# Patient Record
Sex: MALE | Race: WHITE | Employment: FULL TIME
[De-identification: names, ages, dates, MRNs, and addresses within clinical notes are randomized per-mention and may not be internally consistent; named-entity substitution may affect disease eponyms.]

---

## 2022-05-25 LAB
CHOLEST SERPL-MCNC: 138 MG/DL
GLUCOSE SERPL-MCNC: 83 MG/DL (ref 65–100)
HDLC SERPL-MCNC: 55 MG/DL (ref 40–60)
LDLC SERPL CALC-MCNC: 56 MG/DL
TRIGL SERPL-MCNC: 135 MG/DL (ref 35–150)

## 2022-09-14 ENCOUNTER — OFFICE VISIT (OUTPATIENT)
Dept: FAMILY MEDICINE CLINIC | Facility: CLINIC | Age: 22
End: 2022-09-14
Payer: COMMERCIAL

## 2022-09-14 VITALS
DIASTOLIC BLOOD PRESSURE: 70 MMHG | WEIGHT: 238.4 LBS | HEART RATE: 57 BPM | SYSTOLIC BLOOD PRESSURE: 118 MMHG | OXYGEN SATURATION: 97 % | HEIGHT: 72 IN | TEMPERATURE: 98.3 F | BODY MASS INDEX: 32.29 KG/M2

## 2022-09-14 DIAGNOSIS — I10 PRIMARY HYPERTENSION: Primary | ICD-10-CM

## 2022-09-14 PROCEDURE — 99203 OFFICE O/P NEW LOW 30 MIN: CPT | Performed by: PHYSICIAN ASSISTANT

## 2022-09-14 RX ORDER — LISINOPRIL 5 MG/1
5 TABLET ORAL DAILY
Qty: 90 TABLET | Refills: 2 | Status: SHIPPED | OUTPATIENT
Start: 2022-09-14 | End: 2022-09-23 | Stop reason: SDUPTHER

## 2022-09-14 RX ORDER — LISINOPRIL 10 MG/1
10 TABLET ORAL DAILY
COMMUNITY
End: 2022-09-14 | Stop reason: SDUPTHER

## 2022-09-14 ASSESSMENT — PATIENT HEALTH QUESTIONNAIRE - PHQ9
SUM OF ALL RESPONSES TO PHQ QUESTIONS 1-9: 0
SUM OF ALL RESPONSES TO PHQ QUESTIONS 1-9: 0
2. FEELING DOWN, DEPRESSED OR HOPELESS: 0
SUM OF ALL RESPONSES TO PHQ9 QUESTIONS 1 & 2: 0
1. LITTLE INTEREST OR PLEASURE IN DOING THINGS: 0
SUM OF ALL RESPONSES TO PHQ QUESTIONS 1-9: 0
SUM OF ALL RESPONSES TO PHQ QUESTIONS 1-9: 0

## 2022-09-14 NOTE — PROGRESS NOTES
[address] [place] [address]  Phone [phone]  Fax [phone]  Bo MUNOZ    Patient: Jorge Gregory  YOB: 2000  Patient Age 25 y.o. Patient sex: male  Medical Record:  [id number]  Visit Date:9/14/2022   Author:  Shauna Batista. Niecy Casas Note     Chief complaint  Jorge Gregory  is a 25 y.o. male who was seen on 09/15/22  10:08 AM  for the following reasons:    Chief Complaint   Patient presents with    New Patient     Establish care    Hypertension       Current Medical problems addressed    New patient  Ezra Jordan is 26 yo male with history of hypertension and needs refill of current meds. Great aunts had cerebral aneurysms and family hx of heart disease on dad. He works and then works out 3 days a week and been well controlled on meds. 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 Primary hypertension  I10 lisinopril (PRINIVIL;ZESTRIL) 5 MG tablet         Ezra Jordan was seen today for new patient and hypertension. Diagnoses and all orders for this visit:    Primary hypertension  -     lisinopril (PRINIVIL;ZESTRIL) 5 MG tablet; Take 1 tablet by mouth daily    Plan includes the following:  Plan: Well-controlled, continue current medications, medication adherence emphasized and lifestyle modifications recommended     No follow-up provider specified. No orders of the defined types were placed in this encounter. Past Medical History: Allergies:No Known Allergies    Current Medications:   Medications marked \"taking\" at this time:  Current Outpatient Medications   Medication Sig Dispense Refill    lisinopril (PRINIVIL;ZESTRIL) 5 MG tablet Take 1 tablet by mouth daily 90 tablet 2     No current facility-administered medications for this visit. Current Problem List: There is no problem list on file for this patient.       Social History:   Social History     Tobacco Use    Smoking status: Never Smokeless tobacco: Never   Substance Use Topics    Alcohol use: Yes     Alcohol/week: 5.0 standard drinks     Types: 5 Cans of beer per week       Family History:   Family History   Problem Relation Age of Onset    Elevated Lipids Mother     High Blood Pressure Father     Elevated Lipids Father     Cancer Maternal Aunt         skin can    Cancer Maternal Grandfather         skin cancer    Coronary Art Dis Paternal Grandmother        Surgical History:  Past Surgical History:   Procedure Laterality Date    FRACTURE SURGERY Right     ring finger       ROS  Review of Systems   Constitutional:  Negative for fever. Eyes:  Negative for visual disturbance. Respiratory:  Negative for cough and shortness of breath. Cardiovascular:  Negative for chest pain. Gastrointestinal:  Negative for blood in stool. Musculoskeletal:  Negative for myalgias. Skin:  Negative for rash. Neurological:  Negative for syncope and weakness. Psychiatric/Behavioral:  Negative for dysphoric mood. /70   Pulse 57   Temp 98.3 °F (36.8 °C)   Ht 6' (1.829 m)   Wt 238 lb 6.4 oz (108.1 kg)   SpO2 97%   BMI 32.33 kg/m²   Body mass index is 32.33 kg/m². Physical Exam    Physical Exam  Vitals and nursing note reviewed. Constitutional:       Appearance: Normal appearance. Eyes:      Conjunctiva/sclera: Conjunctivae normal.   Cardiovascular:      Rate and Rhythm: Normal rate and regular rhythm. Pulmonary:      Effort: Pulmonary effort is normal.      Breath sounds: Normal breath sounds. Musculoskeletal:      Cervical back: Neck supple. Right lower leg: No edema. Left lower leg: No edema. Neurological:      Mental Status: He is alert. Psychiatric:         Mood and Affect: Mood normal.        I have reviewed the patient's past medical history, social history and family history and vitals. We have discussed treatment plan and follow up and given patient instructions.   Patient's questions are answered and we will follow up as indicated. No follow-ups on file. Irma Singh PA-C  10:08 AM  9/15/2022

## 2022-09-14 NOTE — PATIENT INSTRUCTIONS
Blood pressure is elevated today. Advised of the importance of compliance with the patients medications. Patient agrees to monitoring bp for 2 weeks . If it remains  greater than or equal to 140/90  they will call to schedule an appointment to return for medication adjustments.

## 2022-09-15 ASSESSMENT — ENCOUNTER SYMPTOMS
BLOOD IN STOOL: 0
SHORTNESS OF BREATH: 0
COUGH: 0

## 2022-09-22 ENCOUNTER — TELEPHONE (OUTPATIENT)
Dept: FAMILY MEDICINE CLINIC | Facility: CLINIC | Age: 22
End: 2022-09-22

## 2022-09-22 DIAGNOSIS — I10 PRIMARY HYPERTENSION: ICD-10-CM

## 2022-09-22 NOTE — TELEPHONE ENCOUNTER
Pt called and stated that he has checked with the pharmacy twice and they have stated both times that they have not received the request for his lisinopril. Would you please re send the prescription to the [facility] in [place]?     Thank  you

## 2022-09-23 RX ORDER — LISINOPRIL 5 MG/1
5 TABLET ORAL DAILY
Qty: 90 TABLET | Refills: 2 | Status: SHIPPED | OUTPATIENT
Start: 2022-09-23

## 2022-09-23 NOTE — TELEPHONE ENCOUNTER
Resbumitted but make sure they are checking correct pharmacy and if its wrong one it can be transferred  Pharmacy: [id number], [place] - [facility]  Med Dose History

## 2023-01-23 ENCOUNTER — OFFICE VISIT (OUTPATIENT)
Dept: FAMILY MEDICINE CLINIC | Facility: CLINIC | Age: 23
End: 2023-01-23
Payer: COMMERCIAL

## 2023-01-23 DIAGNOSIS — I10 BENIGN ESSENTIAL HTN: Chronic | ICD-10-CM

## 2023-01-23 DIAGNOSIS — I47.1 PAROXYSMAL SUPRAVENTRICULAR TACHYCARDIA (HCC): ICD-10-CM

## 2023-01-23 DIAGNOSIS — Z00.00 WELL ADULT EXAM: Primary | ICD-10-CM

## 2023-01-23 DIAGNOSIS — Z13.1 SCREENING FOR DIABETES MELLITUS: ICD-10-CM

## 2023-01-23 DIAGNOSIS — Z79.899 HIGH RISK MEDICATION USE: ICD-10-CM

## 2023-01-23 DIAGNOSIS — Z11.4 SCREENING FOR HIV WITHOUT PRESENCE OF RISK FACTORS: ICD-10-CM

## 2023-01-23 DIAGNOSIS — Z13.220 SCREENING FOR LIPID DISORDERS: ICD-10-CM

## 2023-01-23 DIAGNOSIS — Z11.59 NEED FOR HEPATITIS C SCREENING TEST: ICD-10-CM

## 2023-01-23 DIAGNOSIS — E66.09 CLASS 1 OBESITY DUE TO EXCESS CALORIES WITHOUT SERIOUS COMORBIDITY WITH BODY MASS INDEX (BMI) OF 32.0 TO 32.9 IN ADULT: ICD-10-CM

## 2023-01-23 PROBLEM — E66.811 CLASS 1 OBESITY DUE TO EXCESS CALORIES WITHOUT SERIOUS COMORBIDITY WITH BODY MASS INDEX (BMI) OF 32.0 TO 32.9 IN ADULT: Status: ACTIVE | Noted: 2023-01-23

## 2023-01-23 PROBLEM — R00.2 HEART PALPITATIONS: Status: ACTIVE | Noted: 2018-09-19

## 2023-01-23 PROBLEM — E66.811 CLASS 1 OBESITY DUE TO EXCESS CALORIES WITHOUT SERIOUS COMORBIDITY WITH BODY MASS INDEX (BMI) OF 32.0 TO 32.9 IN ADULT: Chronic | Status: ACTIVE | Noted: 2023-01-23

## 2023-01-23 PROBLEM — D17.1 LIPOMA OF ABDOMINAL WALL: Status: ACTIVE | Noted: 2018-03-01

## 2023-01-23 PROCEDURE — 99385 PREV VISIT NEW AGE 18-39: CPT | Performed by: FAMILY MEDICINE

## 2023-01-23 PROCEDURE — 3078F DIAST BP <80 MM HG: CPT | Performed by: FAMILY MEDICINE

## 2023-01-23 PROCEDURE — 3074F SYST BP LT 130 MM HG: CPT | Performed by: FAMILY MEDICINE

## 2023-01-23 RX ORDER — LISINOPRIL 10 MG/1
10 TABLET ORAL DAILY
Qty: 90 TABLET | Refills: 3 | Status: SHIPPED | OUTPATIENT
Start: 2023-01-23

## 2023-01-23 ASSESSMENT — PATIENT HEALTH QUESTIONNAIRE - PHQ9
2. FEELING DOWN, DEPRESSED OR HOPELESS: 0
SUM OF ALL RESPONSES TO PHQ QUESTIONS 1-9: 0
1. LITTLE INTEREST OR PLEASURE IN DOING THINGS: 0
SUM OF ALL RESPONSES TO PHQ9 QUESTIONS 1 & 2: 0

## 2023-01-23 ASSESSMENT — ENCOUNTER SYMPTOMS
VOMITING: 0
NAUSEA: 0
WHEEZING: 0
SHORTNESS OF BREATH: 0
CONSTIPATION: 0
BACK PAIN: 0
ABDOMINAL PAIN: 0
COUGH: 0
CHEST TIGHTNESS: 0
EYES NEGATIVE: 1
ALLERGIC/IMMUNOLOGIC NEGATIVE: 1
DIARRHEA: 0

## 2023-01-23 NOTE — PATIENT INSTRUCTIONS
Fareed Salazar and Yimi Burns (Cat)      The easiest way to reach us is via the [employer] patient portal. But if you are not computer \"literate,\" consider asking a family member or friend to help you get set up on your [facility] [employer] or smart phone. You might even want to \"allow\" a family member to get/send messages for you! In a more urgent situation, or if you have not heard back in a reasonable amount of time after leaving a [employer] message, just call the office at [phone]. If it's an emergency, always start by calling 911.       ^^^^^    Low Carbohydrate/Reduced Fat Diet (25/40 Rule)    With insulin resistance (BUT ALSO FOR ANYONE TRYING TO LOSE WEIGHT), carbohydrates become a source of problems as carbohydrates break down with digestion into glucose (sugar) molecules. After years of following patients in the clinic and monitoring thousands of patients home  glucose monitor reports in combination with advanced lipid  evaluation, I have come to the conclusion that carbohydrates ingestion greater than 25 grams in a four hour period leads to the liver manufacturing large quantities of bad fats and bad inflammatory factors for approximately four days. It is vital that every patient eat <25 grams of carbohydrates every four hours strictly and consistently! Bread and fruit are almost deal-breakers when you attempt to stay under 25 grams of carbohydrates every four hours. It seems strange to say this, but it is better to avoid bread and fruit altogether if you have any form of insulin resistance. I suggest that insulin resistant patients avoid pasta and rice as well due to the amount of carbohydrates contained within pasta and rice.   It is difficult to stay under  25 grams of carbohydrates when rice and pasta are included in the meal. Some dieticians/clinicians suggest that whole wheat pasta and brown rice are acceptable options due to the complex carbohydrates contained within these foods, but I see problems with this strategy and suggest that you avoid all pasta and all rice intake. Low carbohydrates diets emphasize proteins as proteins are your friend!   To succeed at our low carbohydrate/reduced fat diet you will be eating mostly, meat, vegetables, soup, and salad. Most vegetables are great for this diet with the exception of potatoes, corn, carrots, and beets. Some of the other vegetables contain a modest amount of carbohydrates but, in my experience, most of the other vegetables are well tolerated when reasonable portions are eaten. Your carbohydrate allowance is strictly <25 grams in a four hour period. If you skip a meal, you cannot eat 50 grams of carbohydrates in the next feeding time period! There is no credit system for your carbohydrate reduced diet! If you miss eating a meal, you simply loose the 25 gram carbohydrate allotment that you could have eaten. If you eat all 25 grams of the carbohydrates that you are allowed at a mealtime, you cannot eat a 12 gram carbohydrate snack in 2 hours. If you want to snack between meals, you have to lessen your carbohydrate intake at mealtimes so that the total carbohydrates for the meal and snack do not exceed 25 grams total in a four hour time period. Regarding fat intake, you want to be careful to avoid saturated fats, often obtained from red meat, eggs, and fried foods. If the meat doesnt swim underwater or fly, it is probably bad for you! You can cook your meats almost anyway imaginable except do not aguilar your food! Baking, broiling, grilling, rotisserie, roasting, or microwaving your meats all will be fine.    SO... LIMIT BREAD, FRUIT, PASTA, RICE, POTATOES, AND CORN! Adding >40 minutes of nonstop, aerobic exercise daily to the above low carbohydrate/reduced fat diet will help remove the endothelial inflammatory factors and help you maximize your overall cardiovascular health status!     The 25/40 rule is the summation of what every insulin resistant patient needs to have deeply embedded in their mindset! The 25 represents the absolute 4 hour limit of carbohydrate intake while the 40 represents the minimum amount of nonstop, aerobic exercise necessary to obtain removal of endothelial inflammation. Following the 25/40 rule is the key to successful lifestyle changes that facilitate a healthy cardiovascular system! Are you DRINKING any calories? If you are, they are adding weight and not even filling you up. Cutting those carbs out will cause you to lose weight without even trying!!! Think about SWEET TEA (killing Rosibel Caseom Javier), JUICES (just concentrated fruits without fiber), SODA (I hate to say it but DIET SODA would certainly be better), MILK (God designed it for making young calves into BIG cows fast. You don't need it. Try almond, coconut, rice, soy milk or combinations. But make sure they are UNSWEETENED. BEER and WINE (self explanatory). An \"occasional\" one of these is probably not an issue. But NOT daily. So...DO NOT DRINK:  SWEET TEA, SUGAR SODA, JUICES, MILK, BEER OR WINE! Want more good ideas?   Watch the documentary called:  \"[place] [employer]\"on [employer] (or other online streaming platform)

## 2023-01-23 NOTE — PROGRESS NOTES
Óscar Steiner DO                Diplomate of the American [employer] Board of [facility] [place]         [phone]    Ronak Moreno is a 21 y.o. male who was seen on 1/23/2023 for   Chief Complaint   Patient presents with    New Patient     Would like to establish today. Does have hx or HTN. Brought a log from home today as well. Assessment & Plan     Diagnosis Orders   1. Well adult exam  HIV 1/2 Ag/Ab, 4TH Generation,W Rflx Confirm    Hepatitis C Antibody    Basic Metabolic Panel    1/98/52      2. Benign essential HTN  Basic Metabolic Panel    lisinopril (PRINIVIL;ZESTRIL) 10 MG tablet    Well-controlled but frequent blood pressure checks advised. Report if greater than 139/89      3. Paroxysmal supraventricular tachycardia (HCC)      Stable. 4. Class 1 obesity due to excess calories without serious comorbidity with body mass index (BMI) of 32.0 to 32.9 in adult      Unchanged. Low carb diet advised/reviewed. Information given. 5. Screening for diabetes mellitus  Basic Metabolic Panel      6. Screening for lipid disorders        7. Need for hepatitis C screening test  Hepatitis C Antibody      8. Screening for HIV without presence of risk factors  HIV 1/2 Ag/Ab, 4TH Generation,W Rflx Confirm      9. High risk medication use  Basic Metabolic Panel        Follow-up and Dispositions    Return in about 1 year (around 1/24/2024) for CPE CINDY. RECENT LABS/TESTS TO REVIEW and DISCUSS    No results found for this visit on 01/23/23. PHQ-9  1/23/2023 9/14/2022   Little interest or pleasure in doing things 0 0   Feeling down, depressed, or hopeless 0 0   PHQ-2 Score 0 0   PHQ-9 Total Score 0 0     Subjective    HPI:     This is a 42-year-old male patient who is here today to establish care at [address] [place] [address] and have a complete physical examination. The patient did not have labs in advance of today's visit.  Instead we will draw labs today or at a future date. 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 The patient has a history of hypertension. He brings a record of blood pressure readings with him today. He takes lisinopril 10 mg daily. Blood pressure today was initially 136/92. Subsequently 130/76. Reviewed and updated this visit by provider:  Tobacco           Review of Systems   Constitutional:  Negative for fatigue and fever. HENT: Negative. Eyes: Negative. Respiratory:  Negative for cough, chest tightness, shortness of breath and wheezing. Cardiovascular:  Negative for chest pain and leg swelling. Gastrointestinal:  Negative for abdominal pain, constipation, diarrhea, nausea and vomiting. Endocrine: Negative. Genitourinary:  Negative for difficulty urinating, dysuria and frequency. Musculoskeletal:  Negative for arthralgias, back pain and neck pain. Skin:  Negative for rash. Allergic/Immunologic: Negative. Neurological:  Negative for dizziness and light-headedness. Psychiatric/Behavioral:  Negative for dysphoric mood. The patient is not nervous/anxious. All other systems reviewed and are negative. Objective    /76   Pulse 84   Resp 16   Ht 6' (1.829 m)   Wt 239 lb 3.2 oz (108.5 kg)   SpO2 99%   BMI 32.44 kg/m²     Physical Exam  Vitals reviewed. Constitutional:       General: He is not in acute distress. Appearance: Normal appearance. He is well-developed and well-groomed. HENT:      Head: Normocephalic and atraumatic. Right Ear: Tympanic membrane, ear canal and external ear normal.      Left Ear: Tympanic membrane, ear canal and external ear normal.      Mouth/Throat:      Mouth: Mucous membranes are moist.   Eyes:      General: Lids are normal.      Extraocular Movements: Extraocular movements intact. Conjunctiva/sclera: Conjunctivae normal.      Pupils: Pupils are equal, round, and reactive to light.    Neck: Vascular: No carotid bruit. Cardiovascular:      Rate and Rhythm: Normal rate and regular rhythm. Heart sounds: Normal heart sounds. Pulmonary:      Effort: Pulmonary effort is normal.      Breath sounds: Normal breath sounds. Abdominal:      General: Bowel sounds are normal.      Palpations: Abdomen is soft. There is no hepatomegaly, splenomegaly or mass. Tenderness: There is no abdominal tenderness. Hernia: No hernia is present. There is no hernia in the left inguinal area or right inguinal area. Genitourinary:     Penis: Normal.       Testes: Normal.      Epididymis:      Right: Normal.      Left: Normal.   Musculoskeletal:      Cervical back: Neck supple. No rigidity or tenderness. Lymphadenopathy:      Cervical: No cervical adenopathy. Skin:     General: Skin is warm and dry. Neurological:      General: No focal deficit present. Mental Status: He is alert and oriented to person, place, and time. Cranial Nerves: Cranial nerves 2-12 are intact. Sensory: Sensation is intact. Motor: Motor function is intact. Deep Tendon Reflexes: Reflexes are normal and symmetric. Reflex Scores:       Brachioradialis reflexes are 2+ on the right side and 2+ on the left side. Patellar reflexes are 2+ on the right side and 2+ on the left side. Psychiatric:         Attention and Perception: Attention and perception normal.         Mood and Affect: Mood and affect normal.         Speech: Speech normal.         Behavior: Behavior normal. Behavior is cooperative. Thought Content: Thought content normal.         Cognition and Memory: Cognition and memory normal.         Judgment: Judgment normal.       On this date 01/23/2023 I have spent 30 minutes reviewing previous notes, lab/imaging results and face to face with the patient discussing the diagnoses and importance of compliance with the treatment plan, as well as documenting on the day of the visit.         Kiran Fermin Gilmar Hartley, DO  [facility]

## 2023-02-07 VITALS
BODY MASS INDEX: 32.4 KG/M2 | OXYGEN SATURATION: 99 % | WEIGHT: 239.2 LBS | DIASTOLIC BLOOD PRESSURE: 76 MMHG | HEIGHT: 72 IN | SYSTOLIC BLOOD PRESSURE: 130 MMHG | RESPIRATION RATE: 16 BRPM | HEART RATE: 84 BPM

## 2023-02-10 ENCOUNTER — NURSE TRIAGE (OUTPATIENT)
Dept: OTHER | Facility: CLINIC | Age: 23
End: 2023-02-10

## 2023-02-10 NOTE — TELEPHONE ENCOUNTER
Location of patient: [place]    Location of employment: 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 Helped out with lumbar puncture. Patient tested positive for bacterial meningitis and associate was wearing surgical mask during encounter\"    Current Symptoms: denies at this time.     Pain Severity: denies    Temperature: denies     What has been tried: n/a    LMP: NA Pregnant: NA    Recommended disposition: See PCP within 24 Hours    Care advice provided, caller verbalizes understanding; denies any other questions or concerns.    Patient agrees to follow up with occupational health in recommended time frame, provider approved locations and associate health nurses contact information.     Reason for Disposition   [1] Bacterial meningitis EXPOSURE (Close Contact) AND [2] NO symptoms    Protocols used: Meningitis Exposure-ADULT-

## 2023-02-22 PROBLEM — Z13.1 SCREENING FOR DIABETES MELLITUS: Chronic | Status: RESOLVED | Noted: 2023-01-23 | Resolved: 2023-02-22

## 2023-02-22 PROBLEM — Z13.220 SCREENING FOR LIPID DISORDERS: Chronic | Status: RESOLVED | Noted: 2023-01-23 | Resolved: 2023-02-22

## 2023-02-22 PROBLEM — Z00.00 WELL ADULT EXAM: Chronic | Status: RESOLVED | Noted: 2023-01-23 | Resolved: 2023-02-22

## 2023-07-12 DIAGNOSIS — I10 PRIMARY HYPERTENSION: ICD-10-CM

## 2023-07-12 RX ORDER — LISINOPRIL 5 MG/1
TABLET ORAL
Qty: 30 TABLET | Refills: 8 | OUTPATIENT
Start: 2023-07-12

## 2024-02-15 DIAGNOSIS — I10 BENIGN ESSENTIAL HTN: Chronic | ICD-10-CM

## 2024-02-15 RX ORDER — LISINOPRIL 10 MG/1
10 TABLET ORAL DAILY
Qty: 90 TABLET | Refills: 0 | Status: SHIPPED | OUTPATIENT
Start: 2024-02-15

## 2024-02-15 NOTE — TELEPHONE ENCOUNTER
Patient called needs to get refill on lisinopril (PRINIVIL;ZESTRIL) 10 MG tablet  to [facility]

## 2024-05-01 ENCOUNTER — OFFICE VISIT (OUTPATIENT)
Dept: FAMILY MEDICINE CLINIC | Facility: CLINIC | Age: 24
End: 2024-05-01

## 2024-05-01 VITALS
DIASTOLIC BLOOD PRESSURE: 88 MMHG | BODY MASS INDEX: 31.64 KG/M2 | SYSTOLIC BLOOD PRESSURE: 136 MMHG | HEIGHT: 72 IN | HEART RATE: 74 BPM | OXYGEN SATURATION: 99 % | WEIGHT: 233.6 LBS

## 2024-05-01 DIAGNOSIS — I10 BENIGN ESSENTIAL HTN: Chronic | ICD-10-CM

## 2024-05-01 LAB
ALBUMIN SERPL-MCNC: 4.6 G/DL (ref 3.5–5)
ALBUMIN/GLOB SERPL: 1.5 (ref 1–1.9)
ALP SERPL-CCNC: 60 U/L (ref 40–129)
ALT SERPL-CCNC: 36 U/L (ref 12–65)
ANION GAP SERPL CALC-SCNC: 9 MMOL/L (ref 9–18)
AST SERPL-CCNC: 28 U/L (ref 15–37)
BASOPHILS # BLD: 0 K/UL (ref 0–0.2)
BASOPHILS NFR BLD: 1 % (ref 0–2)
BILIRUB SERPL-MCNC: 0.9 MG/DL (ref 0–1.2)
BUN SERPL-MCNC: 12 MG/DL (ref 6–23)
CALCIUM SERPL-MCNC: 10.1 MG/DL (ref 8.8–10.2)
CHLORIDE SERPL-SCNC: 101 MMOL/L (ref 98–107)
CHOLEST SERPL-MCNC: 151 MG/DL (ref 0–200)
CO2 SERPL-SCNC: 29 MMOL/L (ref 20–28)
CREAT SERPL-MCNC: 0.99 MG/DL (ref 0.8–1.3)
DIFFERENTIAL METHOD BLD: ABNORMAL
EOSINOPHIL # BLD: 0.1 K/UL (ref 0–0.8)
EOSINOPHIL NFR BLD: 3 % (ref 0.5–7.8)
ERYTHROCYTE [DISTWIDTH] IN BLOOD BY AUTOMATED COUNT: 13.2 % (ref 11.9–14.6)
GLOBULIN SER CALC-MCNC: 3 G/DL (ref 2.3–3.5)
GLUCOSE SERPL-MCNC: 85 MG/DL (ref 70–99)
HCT VFR BLD AUTO: 51 % (ref 41.1–50.3)
HDLC SERPL-MCNC: 64 MG/DL (ref 40–60)
HDLC SERPL: 2.4 (ref 0–5)
HGB BLD-MCNC: 16.9 G/DL (ref 13.6–17.2)
IMM GRANULOCYTES # BLD AUTO: 0 K/UL (ref 0–0.5)
IMM GRANULOCYTES NFR BLD AUTO: 0 % (ref 0–5)
LDLC SERPL CALC-MCNC: 61 MG/DL (ref 0–100)
LYMPHOCYTES # BLD: 1.6 K/UL (ref 0.5–4.6)
LYMPHOCYTES NFR BLD: 33 % (ref 13–44)
MCH RBC QN AUTO: 30 PG (ref 26.1–32.9)
MCHC RBC AUTO-ENTMCNC: 33.1 G/DL (ref 31.4–35)
MCV RBC AUTO: 90.4 FL (ref 82–102)
MONOCYTES # BLD: 0.5 K/UL (ref 0.1–1.3)
MONOCYTES NFR BLD: 11 % (ref 4–12)
NEUTS SEG # BLD: 2.6 K/UL (ref 1.7–8.2)
NEUTS SEG NFR BLD: 52 % (ref 43–78)
NRBC # BLD: 0 K/UL (ref 0–0.2)
PLATELET # BLD AUTO: 228 K/UL (ref 150–450)
PMV BLD AUTO: 11.1 FL (ref 9.4–12.3)
POTASSIUM SERPL-SCNC: 4.3 MMOL/L (ref 3.5–5.1)
PROT SERPL-MCNC: 7.6 G/DL (ref 6.3–8.2)
RBC # BLD AUTO: 5.64 M/UL (ref 4.23–5.6)
SODIUM SERPL-SCNC: 138 MMOL/L (ref 136–145)
TRIGL SERPL-MCNC: 130 MG/DL (ref 0–150)
VLDLC SERPL CALC-MCNC: 26 MG/DL (ref 6–23)
WBC # BLD AUTO: 4.9 K/UL (ref 4.3–11.1)

## 2024-05-01 RX ORDER — LISINOPRIL 20 MG/1
20 TABLET ORAL DAILY
Qty: 90 TABLET | Refills: 3 | Status: SHIPPED | OUTPATIENT
Start: 2024-05-01 | End: 2025-04-26

## 2024-05-01 RX ORDER — LISINOPRIL 10 MG/1
10 TABLET ORAL DAILY
Qty: 90 TABLET | Refills: 3 | Status: CANCELLED | OUTPATIENT
Start: 2024-05-01 | End: 2025-05-01

## 2024-05-01 SDOH — ECONOMIC STABILITY: HOUSING INSECURITY
IN THE LAST 12 MONTHS, WAS THERE A TIME WHEN YOU DID NOT HAVE A STEADY PLACE TO SLEEP OR SLEPT IN A SHELTER (INCLUDING NOW)?: NO

## 2024-05-01 SDOH — ECONOMIC STABILITY: INCOME INSECURITY: HOW HARD IS IT FOR YOU TO PAY FOR THE VERY BASICS LIKE FOOD, HOUSING, MEDICAL CARE, AND HEATING?: NOT HARD AT ALL

## 2024-05-01 SDOH — ECONOMIC STABILITY: FOOD INSECURITY: WITHIN THE PAST 12 MONTHS, THE FOOD YOU BOUGHT JUST DIDN'T LAST AND YOU DIDN'T HAVE MONEY TO GET MORE.: NEVER TRUE

## 2024-05-01 SDOH — ECONOMIC STABILITY: FOOD INSECURITY: WITHIN THE PAST 12 MONTHS, YOU WORRIED THAT YOUR FOOD WOULD RUN OUT BEFORE YOU GOT MONEY TO BUY MORE.: NEVER TRUE

## 2024-05-01 ASSESSMENT — PATIENT HEALTH QUESTIONNAIRE - PHQ9
SUM OF ALL RESPONSES TO PHQ QUESTIONS 1-9: 0
2. FEELING DOWN, DEPRESSED OR HOPELESS: NOT AT ALL
SUM OF ALL RESPONSES TO PHQ9 QUESTIONS 1 & 2: 0
SUM OF ALL RESPONSES TO PHQ QUESTIONS 1-9: 0
1. LITTLE INTEREST OR PLEASURE IN DOING THINGS: NOT AT ALL
SUM OF ALL RESPONSES TO PHQ QUESTIONS 1-9: 0
SUM OF ALL RESPONSES TO PHQ QUESTIONS 1-9: 0

## 2024-05-01 ASSESSMENT — ENCOUNTER SYMPTOMS: RESPIRATORY NEGATIVE: 1

## 2024-05-01 NOTE — PROGRESS NOTES
PROGRESS NOTE    SUBJECTIVE:   Abdi George is a 24 y.o. male seen for a follow up visit regarding   Chief Complaint   Patient presents with    Establish Care     Here to establish care with provider; former patient of Dr. Mendoza:  Here for annual physical and follow-up of hypertension.  He is doing well, not exercising presently.  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  He brings in his blood pressure readings, he has a couple of readings over 140 systolic and several in the upper 130s.         Reviewed and updated this visit by provider:           Review of Systems   Respiratory: Negative.     Cardiovascular: Negative.           OBJECTIVE:  Vitals:    05/01/24 1058   BP: 136/88   Site: Left Upper Arm   Cuff Size: Medium Adult   Pulse: 74   SpO2: 99%   Weight: 106 kg (233 lb 9.6 oz)   Height: 1.829 m (6' 0.01\")        Physical Exam   General: Alert and oriented x3, well-appearing  Neck: No adenopathy, thyromegaly or thyroid nodules  Pulmonary: Normal effort, good airflow, no rales or rhonchi  CVS: Regular rate and rhythm, normal S1, S2, no S3 or S4, no murmurs; no carotid bruits, 2+ pedal pulses      Medical problems and test results were reviewed with the patient today.     No results found for this or any previous visit (from the past 672 hour(s)).    No results found for any visits on 05/01/24.     ASSESSMENT and PLAN    1. Benign essential HTN, not at goal for age  Comments:  Will increase lisinopril to 20 mg  The following orders have not been finalized:  Orders:  -    Lisinopril 20 mg  -     Labs today, CBC, CMP, lipid      No follow-ups on file.       Terence Blancas MD

## 2025-04-29 DIAGNOSIS — I10 BENIGN ESSENTIAL HTN: Chronic | ICD-10-CM

## 2025-04-29 NOTE — TELEPHONE ENCOUNTER
Patient called requesting refill on lisinopril (PRINIVIL;ZESTRIL) 20 MG tablet to [facility]

## 2025-05-03 RX ORDER — LISINOPRIL 20 MG/1
20 TABLET ORAL DAILY
Qty: 30 TABLET | Refills: 0 | Status: SHIPPED | OUTPATIENT
Start: 2025-05-03 | End: 2025-06-02

## 2025-05-06 ENCOUNTER — OFFICE VISIT (OUTPATIENT)
Dept: FAMILY MEDICINE CLINIC | Facility: CLINIC | Age: 25
End: 2025-05-06
Payer: COMMERCIAL

## 2025-05-06 VITALS
HEIGHT: 72 IN | SYSTOLIC BLOOD PRESSURE: 118 MMHG | OXYGEN SATURATION: 99 % | WEIGHT: 237.8 LBS | HEART RATE: 68 BPM | DIASTOLIC BLOOD PRESSURE: 80 MMHG | RESPIRATION RATE: 16 BRPM | BODY MASS INDEX: 32.21 KG/M2

## 2025-05-06 DIAGNOSIS — Z00.00 ANNUAL PHYSICAL EXAM: Primary | ICD-10-CM

## 2025-05-06 DIAGNOSIS — I10 BENIGN ESSENTIAL HTN: Chronic | ICD-10-CM

## 2025-05-06 DIAGNOSIS — E78.5 DYSLIPIDEMIA: ICD-10-CM

## 2025-05-06 LAB
ALBUMIN SERPL-MCNC: 4.1 G/DL (ref 3.5–5)
ALBUMIN/GLOB SERPL: 1.4 (ref 1–1.9)
ALP SERPL-CCNC: 61 U/L (ref 40–129)
ALT SERPL-CCNC: 52 U/L (ref 8–55)
ANION GAP SERPL CALC-SCNC: 11 MMOL/L (ref 7–16)
AST SERPL-CCNC: 28 U/L (ref 15–37)
BASOPHILS # BLD: 0.03 K/UL (ref 0–0.2)
BASOPHILS NFR BLD: 0.7 % (ref 0–2)
BILIRUB SERPL-MCNC: 0.5 MG/DL (ref 0–1.2)
BUN SERPL-MCNC: 11 MG/DL (ref 6–23)
CALCIUM SERPL-MCNC: 9.8 MG/DL (ref 8.8–10.2)
CHLORIDE SERPL-SCNC: 105 MMOL/L (ref 98–107)
CHOLEST SERPL-MCNC: 141 MG/DL (ref 0–200)
CO2 SERPL-SCNC: 26 MMOL/L (ref 20–29)
CREAT SERPL-MCNC: 0.98 MG/DL (ref 0.8–1.3)
DIFFERENTIAL METHOD BLD: NORMAL
EOSINOPHIL # BLD: 0.13 K/UL (ref 0–0.8)
EOSINOPHIL NFR BLD: 3 % (ref 0.5–7.8)
ERYTHROCYTE [DISTWIDTH] IN BLOOD BY AUTOMATED COUNT: 13.8 % (ref 11.9–14.6)
GLOBULIN SER CALC-MCNC: 2.9 G/DL (ref 2.3–3.5)
GLUCOSE SERPL-MCNC: 82 MG/DL (ref 70–99)
HCT VFR BLD AUTO: 48.2 % (ref 41.1–50.3)
HDLC SERPL-MCNC: 55 MG/DL (ref 40–60)
HDLC SERPL: 2.6 (ref 0–5)
HGB BLD-MCNC: 16.4 G/DL (ref 13.6–17.2)
IMM GRANULOCYTES # BLD AUTO: 0.02 K/UL (ref 0–0.5)
IMM GRANULOCYTES NFR BLD AUTO: 0.5 % (ref 0–5)
LDLC SERPL CALC-MCNC: 71 MG/DL (ref 0–100)
LYMPHOCYTES # BLD: 1.57 K/UL (ref 0.5–4.6)
LYMPHOCYTES NFR BLD: 35.8 % (ref 13–44)
MCH RBC QN AUTO: 30 PG (ref 26.1–32.9)
MCHC RBC AUTO-ENTMCNC: 34 G/DL (ref 31.4–35)
MCV RBC AUTO: 88.3 FL (ref 82–102)
MONOCYTES # BLD: 0.49 K/UL (ref 0.1–1.3)
MONOCYTES NFR BLD: 11.2 % (ref 4–12)
NEUTS SEG # BLD: 2.15 K/UL (ref 1.7–8.2)
NEUTS SEG NFR BLD: 48.8 % (ref 43–78)
NRBC # BLD: 0 K/UL (ref 0–0.2)
PLATELET # BLD AUTO: 244 K/UL (ref 150–450)
PMV BLD AUTO: 11.4 FL (ref 9.4–12.3)
POTASSIUM SERPL-SCNC: 4.9 MMOL/L (ref 3.5–5.1)
PROT SERPL-MCNC: 7 G/DL (ref 6.3–8.2)
RBC # BLD AUTO: 5.46 M/UL (ref 4.23–5.6)
SODIUM SERPL-SCNC: 142 MMOL/L (ref 136–145)
TRIGL SERPL-MCNC: 76 MG/DL (ref 0–150)
VLDLC SERPL CALC-MCNC: 15 MG/DL (ref 6–23)
WBC # BLD AUTO: 4.4 K/UL (ref 4.3–11.1)

## 2025-05-06 PROCEDURE — 3079F DIAST BP 80-89 MM HG: CPT | Performed by: FAMILY MEDICINE

## 2025-05-06 PROCEDURE — 3074F SYST BP LT 130 MM HG: CPT | Performed by: FAMILY MEDICINE

## 2025-05-06 PROCEDURE — 99395 PREV VISIT EST AGE 18-39: CPT | Performed by: FAMILY MEDICINE

## 2025-05-06 RX ORDER — LISINOPRIL 20 MG/1
20 TABLET ORAL DAILY
Qty: 90 TABLET | Refills: 3 | Status: SHIPPED | OUTPATIENT
Start: 2025-05-06 | End: 2026-05-01

## 2025-05-06 SDOH — ECONOMIC STABILITY: FOOD INSECURITY: WITHIN THE PAST 12 MONTHS, YOU WORRIED THAT YOUR FOOD WOULD RUN OUT BEFORE YOU GOT MONEY TO BUY MORE.: NEVER TRUE

## 2025-05-06 SDOH — ECONOMIC STABILITY: FOOD INSECURITY: WITHIN THE PAST 12 MONTHS, THE FOOD YOU BOUGHT JUST DIDN'T LAST AND YOU DIDN'T HAVE MONEY TO GET MORE.: NEVER TRUE

## 2025-05-06 ASSESSMENT — PATIENT HEALTH QUESTIONNAIRE - PHQ9
1. LITTLE INTEREST OR PLEASURE IN DOING THINGS: NOT AT ALL
2. FEELING DOWN, DEPRESSED OR HOPELESS: NOT AT ALL
SUM OF ALL RESPONSES TO PHQ QUESTIONS 1-9: 0

## 2025-05-06 ASSESSMENT — ENCOUNTER SYMPTOMS: RESPIRATORY NEGATIVE: 1

## 2025-05-06 NOTE — PROGRESS NOTES
PROGRESS NOTE    SUBJECTIVE:   Abdi George is a 25 y.o. male seen for a follow up visit regarding   Chief Complaint   Patient presents with    Annual Exam           GAPS: None        HPI:  Here today for follow-up of chronic problems and for annual physical.  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  Voices no complaints today.         Reviewed and updated this visit by provider:           Review of Systems   Respiratory: Negative.     Cardiovascular: Negative.           OBJECTIVE:  Vitals:    05/06/25 0853   BP: 118/80   Pulse: 68   Resp: 16   SpO2: 99%   Weight: 107.9 kg (237 lb 12.8 oz)   Height: 1.829 m (6' 0.01\")        Physical Exam   General: Alert and oriented x3, well-appearing  HEENT: Normocephalic; external ears, ear canals, and  Tympanic membranes normal; PERRLA, EOMI, normal conjunctiva; normal nasal mucosa without drainage; oropharynx is moist without mucosal lesion  Neck: No adenopathy, thyromegaly or thyroid nodules  Pulmonary: Normal effort, good airflow, no rales or rhonchi  CVS: Regular rate and rhythm, normal S1, S2, no S3 or S4, no murmurs; no carotid bruits, 2+ pedal pulses  Abdomen: Nondistended, normal bowel sounds, nontender without mass organomegaly  : Normal male genitalia, testicles without mass.  Extremities: No cyanosis/clubbing/edema      Medical problems and test results were reviewed with the patient today.     No results found for this or any previous visit (from the past 4 weeks).    No results found for any visits on 05/06/25.     ASSESSMENT and PLAN    1. Annual physical exam  2. Benign essential HTN  Comments:  Well-controlled but frequent blood pressure checks advised.  Report if greater than 139/89  Orders:  -     lisinopril (PRINIVIL;ZESTRIL) 20 MG tablet; Take 1 tablet by mouth daily, Disp-90 tablet, R-3Normal  -     CBC with Auto Differential; Future  -     Comprehensive Metabolic Panel; Future  3. Dyslipidemia  -     Lipid Panel;

## 2025-05-07 NOTE — PATIENT INSTRUCTIONS
We noticed on a recent visit that we missed an opportunity to add the additional resources you requested. We have attached them to the After Visit Summary for the date of service 5/6/2025 for your review.  Should you have any questions regarding the resources, please contact our office.    [place] [facility] Utility - Financial Resources*  (Call [facility] if you need more resources.)      Utility Assistance     [facility] Low Income Home Energy Assistance Program  They offer: energy assistance.  Contact: [phone]; [email or web address]  Helpful Info: Must be a [place] resident and need financial assistance with home energy costs.    [employer]   They offer: wide range of services to low and moderate-income residents in [facility]  Contact: [phone]; [address] [place] [address]    [facility]   They offer: basic needs for stability and support services.   Contact: [phone]; [address] [place] [address]     [facility]   They offer: help for families and individuals in need to pay rent and utility bills, purchase clothing, and food.    Contact: [phone]; [address], [place] [address] [employer]   They offer:  assistance with utility bills   Contact:  [phone]; [address] [place] [address]       [place] [employer]   They offer: Bill, clothes, food, and rent assistance.  Case Management and Counseling services available.   Contact: [phone] [facility] [place] [address]    Medical/Financial  [facility] Financial Assistance  They offer: help uninsured patients who do not qualify for government-sponsored health insurance and cannot afford to pay for their medical care. Insured patients may also qualify depending on family income, family size, and medical needs.   Contact: [phone]   Helpful Info: How

## 2025-05-08 ENCOUNTER — RESULTS FOLLOW-UP (OUTPATIENT)
Dept: FAMILY MEDICINE CLINIC | Facility: CLINIC | Age: 25
End: 2025-05-08